# Patient Record
(demographics unavailable — no encounter records)

---

## 2025-06-02 NOTE — PHYSICAL EXAM
[No Acute Distress] : no acute distress [Well Nourished] : well nourished [No Accessory Muscle Use] : no accessory muscle use [Normal Rate] : normal rate  [Regular Rhythm] : with a regular rhythm [Normal S1, S2] : normal S1 and S2 [No Murmur] : no murmur heard [Pedal Pulses Present] : the pedal pulses are present [No Edema] : there was no peripheral edema [Normal Affect] : the affect was normal [Alert and Oriented x3] : oriented to person, place, and time [Normal Mood] : the mood was normal [de-identified] : SOB w/ mild exertion; RLL diminished, exp wheeze throughout [de-identified] : forgetful, pleasant

## 2025-06-02 NOTE — COUNSELING
[Fall prevention counseling provided] : Fall prevention counseling provided [Adequate lighting] : Adequate lighting [No throw rugs] : No throw rugs [Use proper foot wear] : Use proper foot wear [Use recommended devices] : Use recommended devices [Yes] : Risk of tobacco use and health benefits of smoking cessation discussed: Yes [Cessation strategies including cessation program discussed] : Cessation strategies including cessation program discussed [Use of nicotine replacement therapies and other medications discussed] : Use of nicotine replacement therapies and other medications discussed [Encouraged to pick a quit date and identify support needed to quit] : Encouraged to pick a quit date and identify support needed to quit [No] : Not willing to quit smoking [Patient motivation] : Patient motivation [FreeTextEntry1] : 5 [Needs reinforcement, provided] : Patient needs reinforcement on understanding of lifestyle changes and steps needed to achieve self management goal; reinforcement was provided

## 2025-06-02 NOTE — REVIEW OF SYSTEMS
[Fever] : no fever [Chills] : no chills [Fatigue] : fatigue [Night Sweats] : no night sweats [Shortness Of Breath] : no shortness of breath [Wheezing] : wheezing [Cough] : cough [Dyspnea on Exertion] : dyspnea on exertion [Negative] : Psychiatric

## 2025-06-02 NOTE — HISTORY OF PRESENT ILLNESS
[Post-hospitalization from ___ Hospital] : Post-hospitalization from [unfilled] Hospital [Admitted on: ___] : The patient was admitted on [unfilled] [Discharged on ___] : discharged on [unfilled] [Discharge Summary] : discharge summary [Discharge Med List] : discharge medication list [Patient Contacted By: ____] : and contacted by [unfilled] [FreeTextEntry2] : Copied from EMR, "Hospital Course 82yo M PMhx COPD on PRN 4L at home, active smoker, CVA, CAD s/p CABG, DM, HFrEF, Dementia, depression, HTN, HLD p/w SOB x 1.5 weeks. CT chest without contrast showing mucous impacted segmental bronchi LLL, new bilateral small clustered nodules, GGO/consolidation in RML. Admitted for acute on chronic resp failure 2/2 HFrEF exac, COPD exac, PNA. He was treated with IV diuresis, IV abx, and steroids. He clinically improved was back to his basline O2 on dc. He was evaluated by Cardiology. He received a cardiac cath on 5/19 which showed patent grafts. His medications were optimized. He is to follow up with PCP, Cardiology, and Pulmonology on discharge. "  Pt seen at his [facility] for transitional care management. He reports doing well he appears SOB and continues to smoke. Reports smoking 3-4 cigarettes/day. Using oxygen prn and at hs. He has duoneb using BID.